# Patient Record
Sex: FEMALE | Race: WHITE | Employment: STUDENT | URBAN - METROPOLITAN AREA
[De-identification: names, ages, dates, MRNs, and addresses within clinical notes are randomized per-mention and may not be internally consistent; named-entity substitution may affect disease eponyms.]

---
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
## 2021-01-08 NOTE — PROGRESS NOTES
Zaier Nur has a pain level on 0/10 scale  0    Location tail bone     Description none    Radiation   No    Duration  2 week(s)    Time  none yes

## 2022-05-06 NOTE — PROGRESS NOTES
Mishel is post-op: pilonidal cyst.  Presenting for routine follow-up. S:  Doing well. Patient has noted no excessive redness, swelling, pain and discharge. O:  Wound healing well. Still with minimal drainage. Opening measures 0.2 cm.     A:  Satisfactory course. P: Keep area clean and dry. Used silver nitrate. Patient to return in 4 weeks. Patient is to return as needed for redness, swelling, discomfort, or any concern about her  surgery.
Normal rate, regular rhythm.  Heart sounds S1, S2.  No murmurs, rubs or gallops.

## 2024-06-03 ENCOUNTER — OFFICE VISIT (OUTPATIENT)
Dept: OBGYN | Age: 24
End: 2024-06-03

## 2024-06-03 VITALS
BODY MASS INDEX: 30.4 KG/M2 | SYSTOLIC BLOOD PRESSURE: 120 MMHG | WEIGHT: 161 LBS | DIASTOLIC BLOOD PRESSURE: 79 MMHG | HEIGHT: 61 IN

## 2024-06-03 DIAGNOSIS — N94.6 DYSMENORRHEA: ICD-10-CM

## 2024-06-03 DIAGNOSIS — N94.89 ADNEXAL MASS: Primary | ICD-10-CM

## 2024-06-03 DIAGNOSIS — R42 DIZZINESS: ICD-10-CM

## 2024-06-03 PROCEDURE — 99204 OFFICE O/P NEW MOD 45 MIN: CPT

## 2024-06-03 SDOH — ECONOMIC STABILITY: FOOD INSECURITY: WITHIN THE PAST 12 MONTHS, THE FOOD YOU BOUGHT JUST DIDN'T LAST AND YOU DIDN'T HAVE MONEY TO GET MORE.: NEVER TRUE

## 2024-06-03 SDOH — ECONOMIC STABILITY: HOUSING INSECURITY
IN THE LAST 12 MONTHS, WAS THERE A TIME WHEN YOU DID NOT HAVE A STEADY PLACE TO SLEEP OR SLEPT IN A SHELTER (INCLUDING NOW)?: NO

## 2024-06-03 SDOH — ECONOMIC STABILITY: FOOD INSECURITY: WITHIN THE PAST 12 MONTHS, YOU WORRIED THAT YOUR FOOD WOULD RUN OUT BEFORE YOU GOT MONEY TO BUY MORE.: NEVER TRUE

## 2024-06-03 SDOH — ECONOMIC STABILITY: INCOME INSECURITY: HOW HARD IS IT FOR YOU TO PAY FOR THE VERY BASICS LIKE FOOD, HOUSING, MEDICAL CARE, AND HEATING?: NOT HARD AT ALL

## 2024-06-03 ASSESSMENT — ENCOUNTER SYMPTOMS
RESPIRATORY NEGATIVE: 1
DIARRHEA: 0
VOMITING: 0
CONSTIPATION: 0
GASTROINTESTINAL NEGATIVE: 1
SHORTNESS OF BREATH: 0
ABDOMINAL PAIN: 0
NAUSEA: 0
CHEST TIGHTNESS: 0

## 2024-06-03 ASSESSMENT — PATIENT HEALTH QUESTIONNAIRE - PHQ9
SUM OF ALL RESPONSES TO PHQ QUESTIONS 1-9: 2
2. FEELING DOWN, DEPRESSED OR HOPELESS: SEVERAL DAYS
SUM OF ALL RESPONSES TO PHQ QUESTIONS 1-9: 2
SUM OF ALL RESPONSES TO PHQ9 QUESTIONS 1 & 2: 2
SUM OF ALL RESPONSES TO PHQ QUESTIONS 1-9: 2
1. LITTLE INTEREST OR PLEASURE IN DOING THINGS: SEVERAL DAYS
SUM OF ALL RESPONSES TO PHQ QUESTIONS 1-9: 2

## 2024-06-03 NOTE — PROGRESS NOTES
6/4/24    Mishel HERNÁN PortilloMena  2000    Chief Complaint   Patient presents with    New Patient     Annual exam, regular menses. LMP 5/28/2024, is sexually active, no b/c. Last pap smear was 2022 normal.    Other     Pt seen in ER 5/14/2024 for dizziness, abd pain and ultrasound showed Indeterminate echogenic left ovarian mass. Considerations include a dermoid cyst/teratoma. Other lesions not excluded. Findings could be better characterized with nonemergent CT/MR of the pelvis with IV contrast if clinically warranted. Pt had 2 cycles last month first cycle 5/12/2024. Pt states she also took plan B on 5/24/2024. Pt would like to discuss starting b/c.     Dysmenorrhea     Pt states she has a hx of irregular menses but after delivery in 2022 cycle regulated but cycles are very painful during cycle. Day 2 is usually the worst. Pt states on day 2 she gets dizzy and lightheaded also;.         The patient is a 23 y.o. female, No obstetric history on file. who presents for ED follow up and to establish care.  She is menstruating normally.  She is  sexually active. She is not currently taking birth control. Would like to start birth control.     Went to ED for abdominal pain on 05/14/24, TVUS showed echogenic, avascular intraovarian lesion measures roughly 2.5 x 2.5 x 2.2 cm with dirty posterior acoustic shadowing. Consideration for dermoid cyst/teratoma.     She reports additional symptoms of n/a.  C/o dizziness and lightheadedness during her menses past 2 cycles. This was also happening when she went to ED on 05/14/24. Labs and EKG unremarkable.    Pap smear history: Her last PAP smear was in 2022.  Her results were normal per patient.     Past Medical History:   Diagnosis Date    Anxiety     Body piercing     nose and ear    Dysmenorrhea     Gastric ulcer     \"had stomach ulcer 2016-from taking to much medication for migraines\"    History of migraine     \"on effexor for this\"\"special ear piercing for migraines\"

## 2024-06-21 ENCOUNTER — PROCEDURE VISIT (OUTPATIENT)
Dept: OBGYN | Age: 24
End: 2024-06-21

## 2024-06-21 VITALS
DIASTOLIC BLOOD PRESSURE: 72 MMHG | WEIGHT: 158 LBS | HEART RATE: 80 BPM | SYSTOLIC BLOOD PRESSURE: 122 MMHG | BODY MASS INDEX: 29.85 KG/M2

## 2024-06-21 DIAGNOSIS — Z30.017 NEXPLANON INSERTION: ICD-10-CM

## 2024-06-21 DIAGNOSIS — N83.202 LEFT OVARIAN CYST: ICD-10-CM

## 2024-06-21 DIAGNOSIS — N94.6 DYSMENORRHEA: Primary | ICD-10-CM

## 2024-06-21 NOTE — PROGRESS NOTES
Department of Obstetrics and Gynecology  Nexplanon Insertion Office Visit  Name:  Mishel Mena   CSN: [id number]    : 2000   Age: 23 y.o.        Chief Complaint   Patient presents with    Follow-up     Nexplanon insertion d/t dysmenorrhea. -Urine pregnancy test in office. Consent signed. NDC 74545-714-51 Lot [id number] Exp 2026       SUBJECTIVE:  Mishel Mena is a 23 y.o.  who returns for Nexplanon insertion. She also had a left sided adnexal mass on imaging consistent with a left ovarian dermoid cyst.     GYN HX:    Menses:   Patient's last menstrual period was 2024 (approximate).    Patient's medications, allergies, past medical, surgical, social and family histories were reviewed and updated as appropriate.    ROS:  Constitutional: negative for fever, chills, unexplained weight loss  Genitourinary: negative for dysuria, hematuria, trouble voiding, or incontinence; negative for vaginal discharge, itching, odor, or lesions    OBJECTIVE:   /72 (Site: Left Upper Arm, Position: Sitting, Cuff Size: Large Adult)   Pulse 80   Wt 71.7 kg (158 lb)   LMP 2024 (Approximate)   BMI 29.85 kg/m²     Constitutional: healthy appearing, well nourished, well developed  Skin: no rashes, no lesions  RESP: good respiratory effort  EXT: normal, atraumatic, no cyanosis or edema    Nexplanon Insertion:  The left arm was prepped with betadine 8 cm above the medial epicondyle.  2 cc of 1% lidocaine was injected.  The Nexplanon was inserted easily.  No complications.  The procedure was tolerated well.  A steri-strip bandage was placed over the insertion site.           A/P:   Diagnosis Orders   1. Dysmenorrhea  etonogestrel (NEXPLANON) implant 68 mg      2. Left ovarian cyst  etonogestrel (NEXPLANON) implant 68 mg      3. Nexplanon insertion  POCT urine pregnancy          Dysmenorrhea  Nexplanon placed today, patient tolerated well.  Left ovarian cyst  TVUS on  with uterus

## 2025-04-28 SDOH — ECONOMIC STABILITY: TRANSPORTATION INSECURITY
IN THE PAST 12 MONTHS, HAS LACK OF TRANSPORTATION KEPT YOU FROM MEETINGS, WORK, OR FROM GETTING THINGS NEEDED FOR DAILY LIVING?: NO

## 2025-04-28 SDOH — ECONOMIC STABILITY: FOOD INSECURITY: WITHIN THE PAST 12 MONTHS, YOU WORRIED THAT YOUR FOOD WOULD RUN OUT BEFORE YOU GOT MONEY TO BUY MORE.: NEVER TRUE

## 2025-04-28 SDOH — ECONOMIC STABILITY: INCOME INSECURITY: IN THE LAST 12 MONTHS, WAS THERE A TIME WHEN YOU WERE NOT ABLE TO PAY THE MORTGAGE OR RENT ON TIME?: NO

## 2025-04-28 SDOH — ECONOMIC STABILITY: FOOD INSECURITY: WITHIN THE PAST 12 MONTHS, THE FOOD YOU BOUGHT JUST DIDN'T LAST AND YOU DIDN'T HAVE MONEY TO GET MORE.: NEVER TRUE

## 2025-04-28 SDOH — ECONOMIC STABILITY: TRANSPORTATION INSECURITY
IN THE PAST 12 MONTHS, HAS THE LACK OF TRANSPORTATION KEPT YOU FROM MEDICAL APPOINTMENTS OR FROM GETTING MEDICATIONS?: NO

## 2025-04-28 ASSESSMENT — PATIENT HEALTH QUESTIONNAIRE - PHQ9
1. LITTLE INTEREST OR PLEASURE IN DOING THINGS: SEVERAL DAYS
SUM OF ALL RESPONSES TO PHQ QUESTIONS 1-9: 2
SUM OF ALL RESPONSES TO PHQ QUESTIONS 1-9: 2
SUM OF ALL RESPONSES TO PHQ9 QUESTIONS 1 & 2: 2
2. FEELING DOWN, DEPRESSED OR HOPELESS: SEVERAL DAYS
SUM OF ALL RESPONSES TO PHQ QUESTIONS 1-9: 2
SUM OF ALL RESPONSES TO PHQ QUESTIONS 1-9: 2
2. FEELING DOWN, DEPRESSED OR HOPELESS: SEVERAL DAYS
1. LITTLE INTEREST OR PLEASURE IN DOING THINGS: SEVERAL DAYS

## 2025-04-29 ENCOUNTER — OFFICE VISIT (OUTPATIENT)
Dept: OBGYN | Age: 25
End: 2025-04-29
Payer: COMMERCIAL

## 2025-04-29 VITALS
DIASTOLIC BLOOD PRESSURE: 61 MMHG | BODY MASS INDEX: 29.83 KG/M2 | HEIGHT: 61 IN | SYSTOLIC BLOOD PRESSURE: 104 MMHG | HEART RATE: 69 BPM | WEIGHT: 158 LBS

## 2025-04-29 DIAGNOSIS — N92.1 MENORRHAGIA WITH IRREGULAR CYCLE: ICD-10-CM

## 2025-04-29 DIAGNOSIS — R10.2 PELVIC PAIN: ICD-10-CM

## 2025-04-29 DIAGNOSIS — N94.6 DYSMENORRHEA: Primary | ICD-10-CM

## 2025-04-29 DIAGNOSIS — N83.202 LEFT OVARIAN CYST: ICD-10-CM

## 2025-04-29 PROCEDURE — 99214 OFFICE O/P EST MOD 30 MIN: CPT | Performed by: STUDENT IN AN ORGANIZED HEALTH CARE EDUCATION/TRAINING PROGRAM

## 2025-04-29 NOTE — PROGRESS NOTES
Department of Obstetrics and Gynecology  Office Visit  Name:  Mishel Mena   CSN: [id number]    : 2000   Age: 24 y.o.       Chief Complaint   Patient presents with    Other     Pt presents today to discuss possible procedure d/t cyst on ovary/dysmenorrhea, pt states heavy cramping, stabbing/sharp pain in right abdominal area; pt also reports menses is heavier post nexplanon insertion on 2024.        SUBJECTIVE:  Mishel Mena is a 24 y.o.  who presents for left ovarian cyst, pelvic pain, menorrhagia with irregular cycle, dysmenorrhea. Reports continued right sided pelvic pain. Notes heavier bleeding and irregular cycles.     GYN HX:    Menses:   Patient's last menstrual period was 2025 (exact date).    Patient's medications, allergies, past medical, surgical, social and family histories were reviewed and updated as appropriate.    Past Medical History:   Diagnosis Date    Anxiety     Body piercing     nose and ear    Dysmenorrhea     Gastric ulcer     \"had stomach ulcer -from taking to much medication for migraines\"    History of migraine     \"on effexor for this\"\"special ear piercing for migraines\"    HX OTHER MEDICAL     full term at birth    Pilonidal cyst 2017    Scoliosis     Vegetarian        Past Surgical History:   Procedure Laterality Date    PILONIDAL CYST EXCISION  2017    WISDOM TOOTH EXTRACTION         Social History     Socioeconomic History    Marital status: Single     Spouse name: Not on file    Number of children: Not on file    Years of education: Not on file    Highest education level: Not on file   Occupational History    Not on file   Tobacco Use    Smoking status: Never    Smokeless tobacco: Never   Vaping Use    Vaping status: Every Day   Substance and Sexual Activity    Alcohol use: Yes     Comment: social    Drug use: No    Sexual activity: Yes     Partners: Female     Birth control/protection: Injection   Other Topics Concern

## 2025-05-27 ENCOUNTER — OFFICE VISIT (OUTPATIENT)
Dept: OBGYN | Age: 25
End: 2025-05-27
Payer: COMMERCIAL

## 2025-05-27 VITALS
HEIGHT: 61 IN | BODY MASS INDEX: 29.98 KG/M2 | SYSTOLIC BLOOD PRESSURE: 121 MMHG | DIASTOLIC BLOOD PRESSURE: 79 MMHG | WEIGHT: 158.8 LBS | HEART RATE: 93 BPM

## 2025-05-27 DIAGNOSIS — N83.202 LEFT OVARIAN CYST: Primary | ICD-10-CM

## 2025-05-27 DIAGNOSIS — N92.1 MENORRHAGIA WITH IRREGULAR CYCLE: ICD-10-CM

## 2025-05-27 DIAGNOSIS — D27.1 CYST, OVARY, DERMOID, LEFT: ICD-10-CM

## 2025-05-27 DIAGNOSIS — R10.2 PELVIC PAIN: ICD-10-CM

## 2025-05-27 PROCEDURE — 99214 OFFICE O/P EST MOD 30 MIN: CPT | Performed by: STUDENT IN AN ORGANIZED HEALTH CARE EDUCATION/TRAINING PROGRAM

## 2025-05-27 NOTE — PROGRESS NOTES
Department of Obstetrics and Gynecology  Office Visit  Name:  Mishel Mena   CSN: [id number]    : 2000   Age: 24 y.o.       Chief Complaint   Patient presents with    Other     Patient presents today to discuss u/s d/t pelvic pain and menorrhagia        SUBJECTIVE:  Mishel Mena is a 24 y.o.  who presents for left ovarian cyst, pelvic pain, menorrhagia with irregular cycle, dysmenorrhea. Reports continued right sided pelvic pain. Intermittent pain. Does report when she has the pain it's worse than last year.     GYN HX:    Menses:   Patient's last menstrual period was 2025 (exact date).    Patient's medications, allergies, past medical, surgical, social and family histories were reviewed and updated as appropriate.    Past Medical History:   Diagnosis Date    Anxiety     Body piercing     nose and ear    Dysmenorrhea     Gastric ulcer     \"had stomach ulcer -from taking to much medication for migraines\"    History of migraine     \"on effexor for this\"\"special ear piercing for migraines\"    HX OTHER MEDICAL     full term at birth    Pilonidal cyst 2017    Scoliosis     Vegetarian        Past Surgical History:   Procedure Laterality Date    PILONIDAL CYST EXCISION  2017    WISDOM TOOTH EXTRACTION         Social History     Socioeconomic History    Marital status: Single     Spouse name: Not on file    Number of children: Not on file    Years of education: Not on file    Highest education level: Not on file   Occupational History    Not on file   Tobacco Use    Smoking status: Never    Smokeless tobacco: Never   Vaping Use    Vaping status: Every Day   Substance and Sexual Activity    Alcohol use: Yes     Comment: social    Drug use: No    Sexual activity: Yes     Partners: Female     Birth control/protection: Injection   Other Topics Concern    Not on file   Social History Narrative    ** Merged History Encounter **          Social Drivers of Health

## 2025-05-28 NOTE — PROGRESS NOTES
Uploaded documentation to [facility] for [phone] (LT), [phone] (LT) [facility] OP  Pending auth # [id number]

## 2025-06-04 ENCOUNTER — PREP FOR PROCEDURE (OUTPATIENT)
Dept: OBGYN | Age: 25
End: 2025-06-04

## 2025-06-04 DIAGNOSIS — D27.1 DERMOID CYST OF OVARY, LEFT: ICD-10-CM

## 2025-06-04 DIAGNOSIS — R10.2 ACUTE PELVIC PAIN, FEMALE: ICD-10-CM

## 2025-06-04 DIAGNOSIS — Z30.46 ENCOUNTER FOR NEXPLANON REMOVAL: ICD-10-CM

## 2025-06-10 ENCOUNTER — OFFICE VISIT (OUTPATIENT)
Dept: OBGYN | Age: 25
End: 2025-06-10
Payer: COMMERCIAL

## 2025-06-10 VITALS
WEIGHT: 160 LBS | HEART RATE: 78 BPM | DIASTOLIC BLOOD PRESSURE: 71 MMHG | HEIGHT: 61 IN | BODY MASS INDEX: 30.21 KG/M2 | SYSTOLIC BLOOD PRESSURE: 111 MMHG

## 2025-06-10 DIAGNOSIS — N94.6 DYSMENORRHEA: ICD-10-CM

## 2025-06-10 DIAGNOSIS — D27.1 DERMOID CYST OF OVARY, LEFT: Primary | ICD-10-CM

## 2025-06-10 DIAGNOSIS — R10.2 PELVIC PAIN: ICD-10-CM

## 2025-06-10 DIAGNOSIS — N92.1 MENORRHAGIA WITH IRREGULAR CYCLE: ICD-10-CM

## 2025-06-10 PROCEDURE — 99214 OFFICE O/P EST MOD 30 MIN: CPT | Performed by: STUDENT IN AN ORGANIZED HEALTH CARE EDUCATION/TRAINING PROGRAM

## 2025-06-10 NOTE — PROGRESS NOTES
worsening cycles with heaviness since nexplanon placement.   -Tylenol 1000 mg q6hrs prn and ibuprofen 800 mg q6hrs prn for pain.  -Options discussed with patient including OCP, Nuvaring, Depo-Provera, Patch, Nexplanon and Liletta IUD. Discussed side effect profiles for each. Also discussed trying OCP with nexplanon to help with irregular cycles. She desires nexplanon removal and Liletta IUD placement. Proceed with above procedure.     Follow up as discussed.    Electronically signed by: Meredith Dickinson DO 6/10/2025

## 2025-06-11 NOTE — PROGRESS NOTES
Surgery @ [facility] on 6/16/25 you will be called 6/13/25 with times    NOTHING TO EAT OR DRINK AFTER MIDNIGHT DAY OF SURGERY    1. Enter thru the hospital main entrance on day of surgery, check in at the Information Desk. If you arrive prior to 6:00am, enter thru the ER entrance.    2. Follow the directions as prescribed by the doctor for your procedure and medications.         Morning of surgery take: No Medications          Stop vitamins, supplements and NSAIDS:  NOW (Tylenol)     3. Check with your Doctor regarding stopping blood thinners and follow their instructions.    4. Do not smoke, vape or use chewing tobacco morning of surgery. Do not drink any alcoholic beverages 24 hours prior to surgery.       This includes NA Beer. No street drugs 7 days prior to surgery.    5. If you have dentures, contacts of glasses they will be removed before going to the OR; please bring a case.    6. Please bring picture ID, insurance card, paperwork from the doctor’s office (H & P, Consent, & card for implantable devices).    7. Take a shower with an antibacterial soap the night before surgery and the morning of surgery. Do not put anything on your skin      After your morning shower.    8. You will need a responsible adult to drive you home and check on you after surgery.

## 2025-06-13 ENCOUNTER — ANESTHESIA EVENT (OUTPATIENT)
Dept: OPERATING ROOM | Age: 25
End: 2025-06-13
Payer: COMMERCIAL

## 2025-06-13 NOTE — ANESTHESIA PRE PROCEDURE
Department of Anesthesiology  Preprocedure Note       Name:  Mishel Mena   Age:  24 y.o.  :  2000                                          MRN:  [id number]         Date:  2025      Surgeon: Surgeon(s):  Meredith Dickinson DO    Procedure: Procedure(s):  OVARIAN CYSTECTOMY ROBOTIC ASSISTED  OOPHORECTOMY LAPAROSCOPIC ROBOTIC    Medications prior to admission:   Prior to Admission medications    Medication Sig Start Date End Date Taking? Authorizing Provider   cetirizine (ZYRTEC) 10 MG tablet Take 1 tablet by mouth daily   Yes Maryjo Kramer MD   venlafaxine (EFFEXOR XR) 37.5 MG extended release capsule Take 37.5 mg by mouth daily  Patient not taking: Reported on 6/3/2024    Maryjo Kramer MD   busPIRone (BUSPAR) 5 MG tablet Take 5 mg by mouth 2 times daily  Patient not taking: Reported on 6/3/2024    Maryjo Kramer MD       Current medications:    No current facility-administered medications for this encounter.     Current Outpatient Medications   Medication Sig Dispense Refill   • cetirizine (ZYRTEC) 10 MG tablet Take 1 tablet by mouth daily     • venlafaxine (EFFEXOR XR) 37.5 MG extended release capsule Take 37.5 mg by mouth daily (Patient not taking: Reported on 6/3/2024)     • busPIRone (BUSPAR) 5 MG tablet Take 5 mg by mouth 2 times daily (Patient not taking: Reported on 6/3/2024)         Allergies:    Allergies   Allergen Reactions   • Environmental/Seasonal    • Fentanyl Other (See Comments)       Problem List:    Patient Active Problem List   Diagnosis Code   • Pilonidal cyst without abscess L05.91   • Pilonidal cyst L05.91   • Acute pelvic pain, female R10.2   • Dermoid cyst of ovary, left D27.1       Past Medical History:        Diagnosis Date   • Anxiety    • Body piercing     nose and ear   • Dysmenorrhea    • Gastric ulcer     \"had stomach ulcer 2016-from taking to much medication for migraines\"   • History of migraine     \"on effexor for this\"\"special ear

## 2025-06-13 NOTE — PROGRESS NOTES
Notified patient surgery @ [facility] on  6/16/25 @ 1130, arrival 0930. NPO status and morning medications reviewed. Understanding verbalized.

## 2025-06-16 ENCOUNTER — ANESTHESIA (OUTPATIENT)
Dept: OPERATING ROOM | Age: 25
End: 2025-06-16
Payer: COMMERCIAL

## 2025-06-16 ENCOUNTER — HOSPITAL ENCOUNTER (OUTPATIENT)
Age: 25
Setting detail: OUTPATIENT SURGERY
Discharge: HOME | End: 2025-06-16
Attending: STUDENT IN AN ORGANIZED HEALTH CARE EDUCATION/TRAINING PROGRAM | Admitting: STUDENT IN AN ORGANIZED HEALTH CARE EDUCATION/TRAINING PROGRAM
Payer: COMMERCIAL

## 2025-06-16 VITALS
RESPIRATION RATE: 16 BRPM | WEIGHT: 160 LBS | BODY MASS INDEX: 30.21 KG/M2 | SYSTOLIC BLOOD PRESSURE: 102 MMHG | OXYGEN SATURATION: 100 % | TEMPERATURE: 98.1 F | HEART RATE: 45 BPM | DIASTOLIC BLOOD PRESSURE: 65 MMHG | HEIGHT: 61 IN

## 2025-06-16 DIAGNOSIS — D27.1 DERMOID CYST OF OVARY, LEFT: ICD-10-CM

## 2025-06-16 DIAGNOSIS — Z30.46 ENCOUNTER FOR NEXPLANON REMOVAL: ICD-10-CM

## 2025-06-16 DIAGNOSIS — R10.2 ACUTE PELVIC PAIN, FEMALE: ICD-10-CM

## 2025-06-16 DIAGNOSIS — G89.18 POSTOPERATIVE PAIN: Primary | ICD-10-CM

## 2025-06-16 PROBLEM — N94.6 DYSMENORRHEA: Status: ACTIVE | Noted: 2025-06-16

## 2025-06-16 PROBLEM — N92.1 MENORRHAGIA WITH IRREGULAR CYCLE: Status: ACTIVE | Noted: 2025-06-16

## 2025-06-16 LAB
BASOPHILS # BLD: 0.09 K/UL
BASOPHILS NFR BLD: 1 % (ref 0–1)
EOSINOPHIL # BLD: 0.16 K/UL
EOSINOPHILS RELATIVE PERCENT: 3 % (ref 0–3)
ERYTHROCYTE [DISTWIDTH] IN BLOOD BY AUTOMATED COUNT: 12 % (ref 11.7–14.9)
HCG, PREGNANCY URINE (POC): NEGATIVE
HCT VFR BLD AUTO: 40.1 % (ref 37–47)
HGB BLD-MCNC: 13.2 G/DL (ref 12.5–16)
IMM GRANULOCYTES # BLD AUTO: 0.05 K/UL
IMM GRANULOCYTES NFR BLD: 1 %
LYMPHOCYTES NFR BLD: 1.71 K/UL
LYMPHOCYTES RELATIVE PERCENT: 27 % (ref 24–44)
MCH RBC QN AUTO: 31.8 PG (ref 27–31)
MCHC RBC AUTO-ENTMCNC: 32.9 G/DL (ref 32–36)
MCV RBC AUTO: 96.6 FL (ref 78–100)
MONOCYTES NFR BLD: 0.54 K/UL
MONOCYTES NFR BLD: 9 % (ref 0–5)
NEUTROPHILS NFR BLD: 60 % (ref 36–66)
NEUTS SEG NFR BLD: 3.76 K/UL
PLATELET # BLD AUTO: 273 K/UL (ref 140–440)
PMV BLD AUTO: 10.1 FL (ref 7.5–11.1)
RBC # BLD AUTO: 4.15 M/UL (ref 4.2–5.4)
WBC OTHER # BLD: 6.3 K/UL (ref 4–10.5)

## 2025-06-16 PROCEDURE — 3600000019 HC SURGERY ROBOT ADDTL 15MIN: Performed by: STUDENT IN AN ORGANIZED HEALTH CARE EDUCATION/TRAINING PROGRAM

## 2025-06-16 PROCEDURE — 88305 TISSUE EXAM BY PATHOLOGIST: CPT

## 2025-06-16 PROCEDURE — 2500000003 HC RX 250 WO HCPCS: Performed by: STUDENT IN AN ORGANIZED HEALTH CARE EDUCATION/TRAINING PROGRAM

## 2025-06-16 PROCEDURE — S2900 ROBOTIC SURGICAL SYSTEM: HCPCS | Performed by: STUDENT IN AN ORGANIZED HEALTH CARE EDUCATION/TRAINING PROGRAM

## 2025-06-16 PROCEDURE — 58925 REMOVAL OF OVARIAN CYST(S): CPT | Performed by: STUDENT IN AN ORGANIZED HEALTH CARE EDUCATION/TRAINING PROGRAM

## 2025-06-16 PROCEDURE — 7100000010 HC PHASE II RECOVERY - FIRST 15 MIN: Performed by: STUDENT IN AN ORGANIZED HEALTH CARE EDUCATION/TRAINING PROGRAM

## 2025-06-16 PROCEDURE — 2720000010 HC SURG SUPPLY STERILE: Performed by: STUDENT IN AN ORGANIZED HEALTH CARE EDUCATION/TRAINING PROGRAM

## 2025-06-16 PROCEDURE — 7100000001 HC PACU RECOVERY - ADDTL 15 MIN: Performed by: STUDENT IN AN ORGANIZED HEALTH CARE EDUCATION/TRAINING PROGRAM

## 2025-06-16 PROCEDURE — 2500000003 HC RX 250 WO HCPCS

## 2025-06-16 PROCEDURE — 3600000009 HC SURGERY ROBOT BASE: Performed by: STUDENT IN AN ORGANIZED HEALTH CARE EDUCATION/TRAINING PROGRAM

## 2025-06-16 PROCEDURE — 85025 COMPLETE CBC W/AUTO DIFF WBC: CPT

## 2025-06-16 PROCEDURE — 2580000003 HC RX 258

## 2025-06-16 PROCEDURE — 6370000000 HC RX 637 (ALT 250 FOR IP): Performed by: STUDENT IN AN ORGANIZED HEALTH CARE EDUCATION/TRAINING PROGRAM

## 2025-06-16 PROCEDURE — 3700000001 HC ADD 15 MINUTES (ANESTHESIA): Performed by: STUDENT IN AN ORGANIZED HEALTH CARE EDUCATION/TRAINING PROGRAM

## 2025-06-16 PROCEDURE — 7100000011 HC PHASE II RECOVERY - ADDTL 15 MIN: Performed by: STUDENT IN AN ORGANIZED HEALTH CARE EDUCATION/TRAINING PROGRAM

## 2025-06-16 PROCEDURE — 6360000002 HC RX W HCPCS: Performed by: ANESTHESIOLOGY

## 2025-06-16 PROCEDURE — 81025 URINE PREGNANCY TEST: CPT

## 2025-06-16 PROCEDURE — 11982 REMOVE DRUG IMPLANT DEVICE: CPT | Performed by: STUDENT IN AN ORGANIZED HEALTH CARE EDUCATION/TRAINING PROGRAM

## 2025-06-16 PROCEDURE — 3700000000 HC ANESTHESIA ATTENDED CARE: Performed by: STUDENT IN AN ORGANIZED HEALTH CARE EDUCATION/TRAINING PROGRAM

## 2025-06-16 PROCEDURE — 58300 INSERT INTRAUTERINE DEVICE: CPT | Performed by: STUDENT IN AN ORGANIZED HEALTH CARE EDUCATION/TRAINING PROGRAM

## 2025-06-16 PROCEDURE — 6370000000 HC RX 637 (ALT 250 FOR IP): Performed by: ANESTHESIOLOGY

## 2025-06-16 PROCEDURE — 7100000000 HC PACU RECOVERY - FIRST 15 MIN: Performed by: STUDENT IN AN ORGANIZED HEALTH CARE EDUCATION/TRAINING PROGRAM

## 2025-06-16 PROCEDURE — 2709999900 HC NON-CHARGEABLE SUPPLY: Performed by: STUDENT IN AN ORGANIZED HEALTH CARE EDUCATION/TRAINING PROGRAM

## 2025-06-16 PROCEDURE — 2580000003 HC RX 258: Performed by: ANESTHESIOLOGY

## 2025-06-16 PROCEDURE — 6360000002 HC RX W HCPCS

## 2025-06-16 RX ORDER — METHOCARBAMOL 100 MG/ML
INJECTION, SOLUTION INTRAMUSCULAR; INTRAVENOUS
Status: DISCONTINUED | OUTPATIENT
Start: 2025-06-16 | End: 2025-06-16 | Stop reason: SDUPTHER

## 2025-06-16 RX ORDER — ACETAMINOPHEN 500 MG
1000 TABLET ORAL EVERY 6 HOURS PRN
Qty: 120 TABLET | Refills: 0 | Status: SHIPPED | OUTPATIENT
Start: 2025-06-16

## 2025-06-16 RX ORDER — OXYCODONE HYDROCHLORIDE 5 MG/1
5 TABLET ORAL PRN
Status: COMPLETED | OUTPATIENT
Start: 2025-06-16 | End: 2025-06-16

## 2025-06-16 RX ORDER — ONDANSETRON 2 MG/ML
4 INJECTION INTRAMUSCULAR; INTRAVENOUS
Status: COMPLETED | OUTPATIENT
Start: 2025-06-16 | End: 2025-06-16

## 2025-06-16 RX ORDER — GLYCOPYRROLATE 0.2 MG/ML
INJECTION INTRAMUSCULAR; INTRAVENOUS
Status: DISCONTINUED | OUTPATIENT
Start: 2025-06-16 | End: 2025-06-16 | Stop reason: SDUPTHER

## 2025-06-16 RX ORDER — SODIUM CHLORIDE 0.9 % (FLUSH) 0.9 %
5-40 SYRINGE (ML) INJECTION EVERY 12 HOURS SCHEDULED
Status: DISCONTINUED | OUTPATIENT
Start: 2025-06-16 | End: 2025-06-16 | Stop reason: HOSPADM

## 2025-06-16 RX ORDER — SODIUM CHLORIDE 9 MG/ML
INJECTION, SOLUTION INTRAVENOUS PRN
Status: DISCONTINUED | OUTPATIENT
Start: 2025-06-16 | End: 2025-06-16 | Stop reason: HOSPADM

## 2025-06-16 RX ORDER — LIDOCAINE HYDROCHLORIDE 20 MG/ML
INJECTION, SOLUTION EPIDURAL; INFILTRATION; INTRACAUDAL; PERINEURAL
Status: DISCONTINUED | OUTPATIENT
Start: 2025-06-16 | End: 2025-06-16 | Stop reason: SDUPTHER

## 2025-06-16 RX ORDER — SODIUM CHLORIDE 0.9 % (FLUSH) 0.9 %
5-40 SYRINGE (ML) INJECTION PRN
Status: DISCONTINUED | OUTPATIENT
Start: 2025-06-16 | End: 2025-06-16 | Stop reason: HOSPADM

## 2025-06-16 RX ORDER — KETOROLAC TROMETHAMINE 30 MG/ML
15 INJECTION, SOLUTION INTRAMUSCULAR; INTRAVENOUS ONCE
Status: COMPLETED | OUTPATIENT
Start: 2025-06-16 | End: 2025-06-16

## 2025-06-16 RX ORDER — BUPIVACAINE HYDROCHLORIDE AND EPINEPHRINE 5; 5 MG/ML; UG/ML
INJECTION, SOLUTION EPIDURAL; INTRACAUDAL; PERINEURAL
Status: DISCONTINUED | OUTPATIENT
Start: 2025-06-16 | End: 2025-06-16 | Stop reason: ALTCHOICE

## 2025-06-16 RX ORDER — OXYCODONE HYDROCHLORIDE 5 MG/1
10 TABLET ORAL PRN
Status: COMPLETED | OUTPATIENT
Start: 2025-06-16 | End: 2025-06-16

## 2025-06-16 RX ORDER — DOCUSATE SODIUM 100 MG/1
100 CAPSULE, LIQUID FILLED ORAL 2 TIMES DAILY PRN
Qty: 30 CAPSULE | Refills: 1 | Status: SHIPPED | OUTPATIENT
Start: 2025-06-16

## 2025-06-16 RX ORDER — MIDAZOLAM HYDROCHLORIDE 1 MG/ML
INJECTION, SOLUTION INTRAMUSCULAR; INTRAVENOUS
Status: DISCONTINUED | OUTPATIENT
Start: 2025-06-16 | End: 2025-06-16 | Stop reason: SDUPTHER

## 2025-06-16 RX ORDER — NALOXONE HYDROCHLORIDE 0.4 MG/ML
INJECTION, SOLUTION INTRAMUSCULAR; INTRAVENOUS; SUBCUTANEOUS PRN
Status: DISCONTINUED | OUTPATIENT
Start: 2025-06-16 | End: 2025-06-16 | Stop reason: HOSPADM

## 2025-06-16 RX ORDER — IBUPROFEN 800 MG/1
800 TABLET, FILM COATED ORAL EVERY 6 HOURS PRN
Qty: 30 TABLET | Refills: 1 | Status: SHIPPED | OUTPATIENT
Start: 2025-06-16

## 2025-06-16 RX ORDER — ACETAMINOPHEN 500 MG
1000 TABLET ORAL ONCE
Status: COMPLETED | OUTPATIENT
Start: 2025-06-16 | End: 2025-06-16

## 2025-06-16 RX ORDER — CELECOXIB 200 MG/1
200 CAPSULE ORAL ONCE
Status: COMPLETED | OUTPATIENT
Start: 2025-06-16 | End: 2025-06-16

## 2025-06-16 RX ORDER — ONDANSETRON 2 MG/ML
INJECTION INTRAMUSCULAR; INTRAVENOUS
Status: DISCONTINUED | OUTPATIENT
Start: 2025-06-16 | End: 2025-06-16 | Stop reason: SDUPTHER

## 2025-06-16 RX ORDER — ROCURONIUM BROMIDE 10 MG/ML
INJECTION, SOLUTION INTRAVENOUS
Status: DISCONTINUED | OUTPATIENT
Start: 2025-06-16 | End: 2025-06-16 | Stop reason: SDUPTHER

## 2025-06-16 RX ORDER — ONDANSETRON 4 MG/1
4 TABLET, FILM COATED ORAL 3 TIMES DAILY PRN
Qty: 15 TABLET | Refills: 1 | Status: SHIPPED | OUTPATIENT
Start: 2025-06-16

## 2025-06-16 RX ORDER — ACETAMINOPHEN 500 MG
1000 TABLET ORAL ONCE
Status: DISCONTINUED | OUTPATIENT
Start: 2025-06-16 | End: 2025-06-16 | Stop reason: HOSPADM

## 2025-06-16 RX ORDER — SCOPOLAMINE 1 MG/3D
1 PATCH, EXTENDED RELEASE TRANSDERMAL ONCE
Status: DISCONTINUED | OUTPATIENT
Start: 2025-06-16 | End: 2025-06-16 | Stop reason: HOSPADM

## 2025-06-16 RX ORDER — PROPOFOL 10 MG/ML
INJECTION, EMULSION INTRAVENOUS
Status: DISCONTINUED | OUTPATIENT
Start: 2025-06-16 | End: 2025-06-16 | Stop reason: SDUPTHER

## 2025-06-16 RX ORDER — IPRATROPIUM BROMIDE AND ALBUTEROL SULFATE 2.5; .5 MG/3ML; MG/3ML
1 SOLUTION RESPIRATORY (INHALATION)
Status: DISCONTINUED | OUTPATIENT
Start: 2025-06-16 | End: 2025-06-16 | Stop reason: HOSPADM

## 2025-06-16 RX ORDER — SODIUM CHLORIDE 9 MG/ML
INJECTION, SOLUTION INTRAVENOUS CONTINUOUS
Status: DISCONTINUED | OUTPATIENT
Start: 2025-06-16 | End: 2025-06-16 | Stop reason: HOSPADM

## 2025-06-16 RX ORDER — METHOCARBAMOL 100 MG/ML
500 INJECTION, SOLUTION INTRAMUSCULAR; INTRAVENOUS 2 TIMES DAILY PRN
Status: DISCONTINUED | OUTPATIENT
Start: 2025-06-16 | End: 2025-06-16 | Stop reason: HOSPADM

## 2025-06-16 RX ORDER — ACETAMINOPHEN 325 MG/1
650 TABLET ORAL ONCE
Status: DISCONTINUED | OUTPATIENT
Start: 2025-06-16 | End: 2025-06-16 | Stop reason: HOSPADM

## 2025-06-16 RX ORDER — DEXAMETHASONE SODIUM PHOSPHATE 4 MG/ML
INJECTION, SOLUTION INTRA-ARTICULAR; INTRALESIONAL; INTRAMUSCULAR; INTRAVENOUS; SOFT TISSUE
Status: DISCONTINUED | OUTPATIENT
Start: 2025-06-16 | End: 2025-06-16 | Stop reason: SDUPTHER

## 2025-06-16 RX ORDER — PROCHLORPERAZINE EDISYLATE 5 MG/ML
5 INJECTION INTRAMUSCULAR; INTRAVENOUS
Status: DISCONTINUED | OUTPATIENT
Start: 2025-06-16 | End: 2025-06-16 | Stop reason: HOSPADM

## 2025-06-16 RX ORDER — OXYCODONE HYDROCHLORIDE 5 MG/1
5 TABLET ORAL EVERY 6 HOURS PRN
Qty: 15 TABLET | Refills: 0 | Status: SHIPPED | OUTPATIENT
Start: 2025-06-16 | End: 2025-06-19

## 2025-06-16 RX ORDER — SODIUM CHLORIDE, SODIUM LACTATE, POTASSIUM CHLORIDE, CALCIUM CHLORIDE 600; 310; 30; 20 MG/100ML; MG/100ML; MG/100ML; MG/100ML
INJECTION, SOLUTION INTRAVENOUS CONTINUOUS
Status: DISCONTINUED | OUTPATIENT
Start: 2025-06-16 | End: 2025-06-16 | Stop reason: HOSPADM

## 2025-06-16 RX ADMIN — CELECOXIB 200 MG: 200 CAPSULE ORAL at 10:31

## 2025-06-16 RX ADMIN — SODIUM CHLORIDE, POTASSIUM CHLORIDE, SODIUM LACTATE AND CALCIUM CHLORIDE: 600; 310; 30; 20 INJECTION, SOLUTION INTRAVENOUS at 11:41

## 2025-06-16 RX ADMIN — KETOROLAC TROMETHAMINE 15 MG: 30 INJECTION, SOLUTION INTRAMUSCULAR; INTRAVENOUS at 15:40

## 2025-06-16 RX ADMIN — ACETAMINOPHEN 1000 MG: 500 TABLET ORAL at 10:31

## 2025-06-16 RX ADMIN — ROCURONIUM BROMIDE 20 MG: 10 INJECTION, SOLUTION INTRAVENOUS at 12:10

## 2025-06-16 RX ADMIN — ONDANSETRON 4 MG: 2 INJECTION INTRAMUSCULAR; INTRAVENOUS at 13:57

## 2025-06-16 RX ADMIN — ROCURONIUM BROMIDE 50 MG: 10 INJECTION, SOLUTION INTRAVENOUS at 11:50

## 2025-06-16 RX ADMIN — METHOCARBAMOL 500 MG: 100 INJECTION INTRAMUSCULAR; INTRAVENOUS at 13:55

## 2025-06-16 RX ADMIN — DEXMEDETOMIDINE 4 MCG: 100 INJECTION, SOLUTION INTRAVENOUS at 13:19

## 2025-06-16 RX ADMIN — HYDROMORPHONE HYDROCHLORIDE 0.5 MG: 1 INJECTION, SOLUTION INTRAMUSCULAR; INTRAVENOUS; SUBCUTANEOUS at 13:12

## 2025-06-16 RX ADMIN — PROPOFOL 200 MG: 10 INJECTION, EMULSION INTRAVENOUS at 11:49

## 2025-06-16 RX ADMIN — LIDOCAINE HYDROCHLORIDE 100 MG: 20 INJECTION, SOLUTION EPIDURAL; INFILTRATION; INTRACAUDAL; PERINEURAL at 11:49

## 2025-06-16 RX ADMIN — MIDAZOLAM 2 MG: 1 INJECTION INTRAMUSCULAR; INTRAVENOUS at 11:37

## 2025-06-16 RX ADMIN — GLYCOPYRROLATE 0.2 MG: 0.2 INJECTION INTRAMUSCULAR; INTRAVENOUS at 12:24

## 2025-06-16 RX ADMIN — OXYCODONE HYDROCHLORIDE 10 MG: 5 TABLET ORAL at 14:38

## 2025-06-16 RX ADMIN — DEXAMETHASONE SODIUM PHOSPHATE 8 MG: 4 INJECTION, SOLUTION INTRAMUSCULAR; INTRAVENOUS at 12:05

## 2025-06-16 RX ADMIN — HYDROMORPHONE HYDROCHLORIDE 0.5 MG: 1 INJECTION, SOLUTION INTRAMUSCULAR; INTRAVENOUS; SUBCUTANEOUS at 12:03

## 2025-06-16 RX ADMIN — SUGAMMADEX 200 MG: 100 INJECTION, SOLUTION INTRAVENOUS at 13:16

## 2025-06-16 RX ADMIN — DEXMEDETOMIDINE 8 MCG: 100 INJECTION, SOLUTION INTRAVENOUS at 12:00

## 2025-06-16 RX ADMIN — METHOCARBAMOL 500 MG: 100 INJECTION INTRAMUSCULAR; INTRAVENOUS at 12:49

## 2025-06-16 RX ADMIN — ONDANSETRON 4 MG: 2 INJECTION INTRAMUSCULAR; INTRAVENOUS at 12:50

## 2025-06-16 ASSESSMENT — PAIN DESCRIPTION - FREQUENCY
FREQUENCY: CONTINUOUS

## 2025-06-16 ASSESSMENT — PAIN DESCRIPTION - LOCATION
LOCATION: ABDOMEN

## 2025-06-16 ASSESSMENT — PAIN DESCRIPTION - DESCRIPTORS
DESCRIPTORS: CRAMPING

## 2025-06-16 ASSESSMENT — PAIN - FUNCTIONAL ASSESSMENT
PAIN_FUNCTIONAL_ASSESSMENT: PREVENTS OR INTERFERES SOME ACTIVE ACTIVITIES AND ADLS
PAIN_FUNCTIONAL_ASSESSMENT: 0-10

## 2025-06-16 ASSESSMENT — PAIN DESCRIPTION - ONSET
ONSET: ON-GOING

## 2025-06-16 ASSESSMENT — PAIN DESCRIPTION - PAIN TYPE
TYPE: SURGICAL PAIN

## 2025-06-16 ASSESSMENT — PAIN SCALES - GENERAL
PAINLEVEL_OUTOF10: 7
PAINLEVEL_OUTOF10: 7
PAINLEVEL_OUTOF10: 6
PAINLEVEL_OUTOF10: 7
PAINLEVEL_OUTOF10: 3
PAINLEVEL_OUTOF10: 6

## 2025-06-16 ASSESSMENT — PAIN DESCRIPTION - ORIENTATION
ORIENTATION: MID

## 2025-06-16 NOTE — PROGRESS NOTES
1333- pt received from OR. Monitors placed and alarms on. Report received from Louann HOWE. Oral airway in place upon arrival to PACU. Alisha pad in place, no drainage noted.  1344- oral airway removed.  1350- pt resting comfortably with eyes closed. Arouses to name being called. Denies any pain or nausea.  1355- pt medicated for complaints of pain.  1357- pt medicated for complaints of nausea.  1408- pt resting comfortably. States nausea has gotten better and pain is tolerable.  1410- alisha pad removed, no drainage noted. New alisha pad placed.  1413- pt transported to [facility] by RN and bedside report given to Kimberly WOODARD.

## 2025-06-16 NOTE — OP NOTE
Department of Obstetrics and Gynecology  Operative Report  Name:  Mishel Mena   CSN: [id number]   Attending Provider: Meredith Dickinson DO  Location:  OR/NONE   : 2000   Age: 24 y.o.    SURGEON: Surgeon(s):  Meredith Dickinson DO     ASSISTANT(S): Teetee Orta CFA  The use of a first assistant was necessary for the proper positioning, prepping, and draping of the patient, as well as the safe and expeditious execution of the case and closure of skin and subcutaneous tissues.    PRE-OP DIAGNOSIS: Acute pelvic pain, female [R10.2]  Dermoid cyst of ovary, left [D27.1]  Encounter for Nexplanon removal [Z30.46]   Encounter for IUD insertion  Menorrhagia irregular cycle    POST-OP DIAGNOSIS: Same    PROCEDURE: Procedure(s):  Robotic laparoscopy with left ovarian dermoid cystectomy, nexplanon removal, IUD insertion    TYPE OF ANESTHESIA: General.     SPECIMENS: Left ovarian cyst    ESTIMATED BLOOD LOSS: <50cc    DEVICES IMPLANTED: Liletta IUD supplied by office, Surgicel powder    COMPLICATIONS: none    INDICATIONS FOR PROCEDURE: The patient is a 24 y.o. female that presented with complaints of  left ovarian dermoid cyst, pelvic pain and menorrhagia with irregular cycle. TVUS on 24 with uterus measuring 6.73 x 3.99 x 3.32 cm. Left ovary with left ovarian dermoid cyst. Prior imaging from ED reviewed which showed measurements of 2.5 cm for ovarian cyst. Nexplanon placed 24. Notes worsening cycles with heaviness since nexplanon placement. Discussed options, patient desires dermoid cyst removal as well as removal of nexplanon and placement of Liletta IUD.        FINDINGS: On bimanual exam, the uterus was noted to be anteverted and of normal size, shape, and symmetry without any adnexal masses palpated. On laparoscopic exam what was visualized of the liver, bowel, and omentum was within normal limits. Bilateral round ligaments, bilateral uterosacral ligaments, bilateral ovarian fossae, bilateral tubes

## 2025-06-16 NOTE — PROGRESS NOTES
1417 - received patient from pacu, report received from Katelyn WOODARD, patient A&O, reports pain 6/10, denies nausea, incisions dry, alisha pad dry, family at bedside, call light within reach, patient reports she needs to go to the bathroom  1426 - patient ambulated to the bathroom, voided  1438 - patient given water and cracker, pain med given, see mar  1532 - VSS, heart rate low, anesthesia notified regarding heart rate and pain, ok to give toradol and discharge, pain 7/10, denies nausea, incisions dry, alisha pad dry  1540 - toradol given  1545 - instructed patient she may get dressed when she feels like she can  1630 - iv removed  1635 - patient left sds via wheelchair accompanied by nurse

## 2025-06-16 NOTE — H&P
NOTED SCANTY GROWTH, COMMENSAL TRAE    Culture       COLIFORM VERY SCANTY GROWTH, OPPORTUNISTIC PATHOGEN    Culture       PSEUDOMONAS SP VERY SCANTY GROWTH, OPPORTUNISTIC PATHOGEN    Culture       GRAM POSITIVE COCCI MODERATE GROWTH, ANAEROBE OPPORTUNIST PATHOGEN    Culture       EIKENELLA MODERATE GROWTH, PRESUMPTIVE ID, ANAEROBE OPPORTUNIST PATHOGEN, SENSITIVITY CAN BE PERFORMED IF CLINICALLY INDICATED, PLEASE CONTACT MICROBIOLOGY DEPT. WITHIN 5 DAYS.    Culture (NOTE)     Culture FAXED TO OFFICE 1223 5/13 BY JUANJO MARINELLI.     Report status FINAL 05/13/2018          Assessment:    Present on Admission:   Dermoid cyst of ovary, left   Encounter for Nexplanon removal   Pelvic pain   Menorrhagia with irregular cycle   Dysmenorrhea         Plan:    1. Discussion was had with the patient regarding risks of the procedure including but not limited to: risk of anesthesia, bleeding, infection, damage to internal structures such as bowel, bladder, ureters, nerves, and major blood vessels. Patient has consented preoperatively knowing these.  2. Routine pre-operative orders.  3. Proceed with scheduled Procedure(s):  OVARIAN CYSTECTOMY ROBOTIC ASSISTED  OOPHORECTOMY LAPAROSCOPIC ROBOTIC  NEXPLANON REMOVAL  UTERUS INTRAUTERINE DEVICE INSERTION.      Electronically signed: Meredith Dickinson DO 6/16/2025

## 2025-06-16 NOTE — DISCHARGE INSTRUCTIONS
[facility]  [phone]    Do not drive, work around machines or use equipment.  Do not drink any alcoholic beverages.  Do not smoke while alone.  Avoid making important decisions.  Plan to spend a quiet, relaxed evening @ home.  Resume normal activities as you begin to feel better.  Eat lightly for your first meal, then gradually increase your diet to what is normal for you.  In case of nausea, avoid food and drink only clear liquids.  Resume food as nausea ceases.  Notify your surgeon if you experience fever, chills, large amount of bleeding, difficulty breathing, persistent nausea and vomiting or any other disturbing problem.  Call for a follow-up appointment with your surgeon.       Meredith Alok QUINTANILLA  [facility] [place]  [address]   Phone [phone]   Fax [phone]     POST-OPERATIVE INSTRUCTIONS        DANGER SIGNS: Call OB/GYN if they occur:     A fever of more than 100.4   Report excessive bleeding (saturating a pad every ½ to 1 hour).   Severe pain, unrelieved by normal medication.   Shortness of breath, difficulty breathing or chest pain.   Painful or burning urination.   Severe back pain.   Painful swelling or redness in legs.   Drainage or pus from the wound.   Foul smelling vaginal discharge.     Eating and drinking:     You may continue your regular diet, however, we recommend that you:    Eat multiple small meals.   Eat foods high in protein such as meat, fish, eggs, and dairy products.   Avoid hot, spicy, and fatty foods.   Eat fruits with vitamin C (i.e. citrus fruits), vegetables, and high fiber foods.   Drink at least 3 quarts of liquid a day (try to drink more earlier in the day).     Constipation:     Narcotic pain medications such as Percocet, Vicodin, and codeine can cause constipation.   Try the following to avoid constipation:   Eat high fiber foods such as prunes or adda fiber supplement such as Metamucil or Citrucel.   Milk of Magnesia.

## 2025-06-17 LAB — SURGICAL PATHOLOGY REPORT: NORMAL

## 2025-06-18 NOTE — ANESTHESIA POSTPROCEDURE EVALUATION
Department of Anesthesiology  Postprocedure Note    Patient: Mishel Mena  MRN: [id number]  YOB: 2000  Date of evaluation: 6/17/2025    Procedure Summary       Date: 06/16/25 Room / Location: [address]    Anesthesia Start: 1141 Anesthesia Stop: 1339    Procedures:       OVARIAN CYSTECTOMY ROBOTIC ASSISTED (Left: Abdomen/Perineum)      NEXPLANON REMOVAL (Left: Arm Upper)      UTERUS INTRAUTERINE DEVICE INSERTION (Vagina ) Diagnosis:       Acute pelvic pain, female      Dermoid cyst of ovary, left      Encounter for Nexplanon removal      (Acute pelvic pain, female [R10.2])      (Dermoid cyst of ovary, left [D27.1])      (Encounter for Nexplanon removal [Z30.46])    Surgeons: Meredith Dickinson DO Responsible Provider: Dav Stevens MD    Anesthesia Type: General ASA Status: 2            Anesthesia Type: General    Padmini Phase I: Padmini Score: 10    Padmini Phase II: Padmini Score: 10    Anesthesia Post Evaluation    Patient location during evaluation: PACU  Patient participation: complete - patient participated  Level of consciousness: awake and alert  Pain score: 1  Airway patency: patent  Nausea & Vomiting: no nausea and no vomiting  Cardiovascular status: hemodynamically stable  Respiratory status: nasal cannula  Hydration status: euvolemic  Pain management: adequate    No notable events documented.

## 2025-06-21 ENCOUNTER — APPOINTMENT (OUTPATIENT)
Dept: ULTRASOUND IMAGING | Age: 25
End: 2025-06-21
Payer: COMMERCIAL

## 2025-06-21 ENCOUNTER — HOSPITAL ENCOUNTER (EMERGENCY)
Age: 25
Discharge: HOME | End: 2025-06-21
Attending: EMERGENCY MEDICINE
Payer: COMMERCIAL

## 2025-06-21 VITALS
DIASTOLIC BLOOD PRESSURE: 61 MMHG | SYSTOLIC BLOOD PRESSURE: 104 MMHG | TEMPERATURE: 99.1 F | RESPIRATION RATE: 17 BRPM | BODY MASS INDEX: 30.23 KG/M2 | HEART RATE: 79 BPM | OXYGEN SATURATION: 98 % | WEIGHT: 160 LBS

## 2025-06-21 DIAGNOSIS — I82.611 SUPERFICIAL VENOUS THROMBOSIS OF RIGHT UPPER EXTREMITY: Primary | ICD-10-CM

## 2025-06-21 PROCEDURE — 99284 EMERGENCY DEPT VISIT MOD MDM: CPT

## 2025-06-21 PROCEDURE — 93971 EXTREMITY STUDY: CPT

## 2025-06-21 RX ORDER — ASPIRIN 325 MG
325 TABLET ORAL DAILY
Qty: 30 TABLET | Refills: 0 | Status: SHIPPED | OUTPATIENT
Start: 2025-06-21

## 2025-06-21 ASSESSMENT — PAIN - FUNCTIONAL ASSESSMENT
PAIN_FUNCTIONAL_ASSESSMENT: 0-10
PAIN_FUNCTIONAL_ASSESSMENT: 0-10

## 2025-06-21 ASSESSMENT — PAIN SCALES - GENERAL
PAINLEVEL_OUTOF10: 2
PAINLEVEL_OUTOF10: 2

## 2025-06-21 NOTE — ED PROVIDER NOTES
CHIEF COMPLAINT    No chief complaint on file.    HPI  Mishel Mena is a 24 y.o. female who presents to the ED with complaints of pain to dorsal aspect of right hand and pain into her right forearm.  Patient underwent laparoscopy with left ovarian dermoid cystectomy and IUD insertion on 06/16.  For that procedure she had placement of IV into dorsal aspect of right hand.  She began to notice 2 days ago some swelling and pain to dorsal aspect of right hand and pain that extended into the right forearm and she felt that she had pain and stiffness/firmness of veins in the right forearm.  She became concerned about blood clot and presented to the emergency department for further evaluation.  Her pain is described as throbbing and stabbing rated as moderate exacerbated by palpation and movement.  Nothing makes it better.  Denies chest pain, shortness of breath, nausea, vomiting, numbness, tingling.      REVIEW OF SYSTEMS  Constitutional: No fever, chills   Eye: No visual changes  HENT: No earache or sore throat.  Resp: No SOB or productive cough.  Cardio: No chest pain or palpitations.  GI: No abdominal pain, nausea, vomiting, constipation or diarrhea. No melena.  : No dysuria, urgency or frequency.  Endocrine: No heat intolerance, no cold intolerance, no polydipsia   Lymphatics: No adenopathy  Musculoskeletal: Complains of pain to right hand and right forearm pain  Neuro: No headaches.  Psych: No homicidal or suicidal thoughts  Skin: No rash, No itching.  ?  ?  PAST MEDICAL HISTORY  Past Medical History:   Diagnosis Date    Anxiety     Body piercing     nose and ear    Dysmenorrhea     Gastric ulcer     \"had stomach ulcer 2016-from taking to much medication for migraines\"    History of migraine     \"on effexor for this\"\"special ear piercing for migraines\"    HX OTHER MEDICAL     full term at birth    Pilonidal cyst 11/28/2017    Scoliosis     Vegetarian      FAMILY HISTORY  No family history on file.  SOCIAL  occlusive superficial venous thrombosis in the varicosity along the dorsal aspect of the right hand.    At this time I feel patient is appropriate for discharge given her evaluation here.  Instructed to take full dose aspirin for 1 month and use warm compresses of right upper extremity.  Patient voiced understanding.  Instructed to follow-up with primary care provider in 1 week.  Return precautions provided.      NIH score 0    Amount and/or Complexity of Data Reviewed  Clinical lab tests: reviewed  Decide to obtain previous medical records or to obtain history from someone other than the patient: yes       -  Patient seen and evaluated in the emergency department.  -  Triage and nursing notes reviewed and incorporated.  -  Old chart records reviewed and incorporated.  -  Work-up included:  See above      Appropriate PPE utilized as indicated for entire patient encounter?  Time of Disposition: See timeline      Independent Imaging Interpretation by me: None     EKG (if obtained): None     Chronic conditions affecting care: None     Discussion with Other Profesionals : None       Social Determinants : None         I am the Primary Clinician of Record.    ?  Discharge Medication List as of 6/21/2025  3:55 AM        START taking these medications    Details   aspirin 325 MG tablet Take 1 tablet by mouth daily, Disp-30 tablet, R-0Normal           FINAL IMPRESSION  1. Superficial venous thrombosis of right upper extremity      Electronically signed by: Filipe Anderson DO, 6/21/2025         Filipe Anderson DO  06/21/25 1914

## 2025-06-25 ENCOUNTER — OFFICE VISIT (OUTPATIENT)
Age: 25
End: 2025-06-25

## 2025-06-25 VITALS
BODY MASS INDEX: 29.83 KG/M2 | WEIGHT: 158 LBS | HEIGHT: 61 IN | SYSTOLIC BLOOD PRESSURE: 109 MMHG | DIASTOLIC BLOOD PRESSURE: 73 MMHG | HEART RATE: 67 BPM

## 2025-06-25 DIAGNOSIS — I82.611 SUPERFICIAL VENOUS THROMBOSIS OF ARM, RIGHT: Primary | ICD-10-CM

## 2025-06-25 DIAGNOSIS — Z97.5 IUD (INTRAUTERINE DEVICE) IN PLACE: ICD-10-CM

## 2025-06-25 DIAGNOSIS — D27.1 DERMOID CYST OF OVARY, LEFT: ICD-10-CM

## 2025-06-25 DIAGNOSIS — Z09 POSTOPERATIVE EXAMINATION: Primary | ICD-10-CM

## 2025-06-25 PROCEDURE — 99024 POSTOP FOLLOW-UP VISIT: CPT | Performed by: STUDENT IN AN ORGANIZED HEALTH CARE EDUCATION/TRAINING PROGRAM

## 2025-06-25 NOTE — PROGRESS NOTES
Department of Obstetrics and Gynecology  Postoperative Office Visit  Name:  Mishel Mena   CSN: [id number]    : 2000   Age: 24 y.o.     Chief Complaint   Patient presents with    Post-Op Check     25 Ovarian cystectomy. Pt reports blood clots in hand and forearm, went to ER  taking aspirin. Pt had cramping for 2 days and has since subsided. States headache that started last night and continued to this morning       Mishel Mena  2000 is a 24 y.o.  Female who presents of the office today for a post op follow up after robotic laparoscopy with left ovarian dermoid cystectomy, nexplanon removal and Liletta IUD insertion on . Eating a regular diet without difficulty. Bowel movements are normal. Reports blood clots in hand and forearm, went to ED on  and on ASA. Had cramping for 2 days that went away. Headache that started last night and continued to this morning.           ALLERGIES  Allergies   Allergen Reactions    Environmental/Seasonal     Fentanyl Other (See Comments)       CURRENT MEDICATIONS  Current Outpatient Medications   Medication Sig Dispense Refill    aspirin 325 MG tablet Take 1 tablet by mouth daily 30 tablet 0    ondansetron (ZOFRAN) 4 MG tablet Take 1 tablet by mouth 3 times daily as needed for Nausea or Vomiting 15 tablet 1    acetaminophen (TYLENOL) 500 MG tablet Take 2 tablets by mouth every 6 hours as needed for Pain Maximum dose- 8 tablets/24 hours. 120 tablet 0    ibuprofen (ADVIL;MOTRIN) 800 MG tablet Take 1 tablet by mouth every 6 hours as needed for Pain 30 tablet 1    docusate sodium (COLACE) 100 MG capsule Take 1 capsule by mouth 2 times daily as needed for Constipation 30 capsule 1    cetirizine (ZYRTEC) 10 MG tablet Take 1 tablet by mouth daily       No current facility-administered medications for this visit.       Review of Systems  Constitutional: negative for chills, fevers and sweats  Respiratory: negative for cough, dyspnea on

## (undated) DEVICE — SUTURE STRATAFIX SPRL PDS + VLT 3-0 15CM DISPOSABLE/SNGLE SXPP1B420

## (undated) DEVICE — SUTURE MONOCRYL SZ 4-0 L18IN ABSRB UD L19MM PS-2 3/8 CIR PRIM Y496G

## (undated) DEVICE — INSUFFLATION NEEDLE TO ESTABLISH PNEUMOPERITONEUM.: Brand: INSUFFLATION NEEDLE

## (undated) DEVICE — NEEDLE 20GAX1.5 HYPO SHRT BVL

## (undated) DEVICE — APPLICATOR MEDICATED 26 CC SOLUTION HI LT ORNG CHLORAPREP

## (undated) DEVICE — TISSUE RETRIEVAL SYSTEM: Brand: INZII RETRIEVAL SYSTEM

## (undated) DEVICE — Device

## (undated) DEVICE — PREMIUM DRY TRAY LF: Brand: MEDLINE INDUSTRIES, INC.

## (undated) DEVICE — SEAL

## (undated) DEVICE — SUTURE STRIP WOUND CLOSURE STRIPS: Brand: SUTURE STRIP®

## (undated) DEVICE — DRAPE,ARM,LASER,STERILE,7" X 96": Brand: MEDLINE

## (undated) DEVICE — GLOVE SURG SZ 6 CRM LTX FREE POLYISOPRENE POLYMER BEAD ANTI

## (undated) DEVICE — TBG INSUFFLATION W/PUSH ON CON: Brand: MEDLINE INDUSTRIES, INC.

## (undated) DEVICE — TOWEL,OR,DSP,ST,BLUE,STD,6/PK,12PK/CS: Brand: MEDLINE

## (undated) DEVICE — SYRINGE MED 10ML LUERLOCK TIP W/O SFTY DISP

## (undated) DEVICE — 40586 ADVANCED TRENDELENBURG POSITIONING KIT: Brand: 40586 ADVANCED TRENDELENBURG POSITIONING KIT

## (undated) DEVICE — PACK,BASIC,SIRUS,V: Brand: MEDLINE

## (undated) DEVICE — SURGICEL ENDOSCP APPL

## (undated) DEVICE — SOLUTION SCRB 4OZ 4% CHG H2O AIDED FOR PREOPERATIVE SKIN

## (undated) DEVICE — INTENDED FOR TISSUE SEPARATION, AND OTHER PROCEDURES THAT REQUIRE A SHARP SURGICAL BLADE TO PUNCTURE OR CUT.: Brand: BARD-PARKER ® STAINLESS STEEL BLADES

## (undated) DEVICE — NEEDLE HYPO 23GA L1.5IN TURQ POLYPR HUB S STL THN WALL IM

## (undated) DEVICE — BANDAGE COBAN 4 IN COMPR W4INXL5YD FOAM COHESIVE QUIK STK SELF ADH SFT

## (undated) DEVICE — GLOVE SURG SZ 65 CRM LTX FREE POLYISOPRENE POLYMER BEAD ANTI

## (undated) DEVICE — AGENT HEMSTAT 3GM OXIDIZED REGENERATED CELOS ABSRB FOR CONT (ORDER MULTIPLES OF 5EA)

## (undated) DEVICE — SUTURE VICRYL + SZ 0 L27IN ABSRB VLT L26MM UR-6 5/8 CIR VCP603H